# Patient Record
Sex: FEMALE | Race: WHITE | NOT HISPANIC OR LATINO | Employment: UNEMPLOYED | URBAN - NONMETROPOLITAN AREA
[De-identification: names, ages, dates, MRNs, and addresses within clinical notes are randomized per-mention and may not be internally consistent; named-entity substitution may affect disease eponyms.]

---

## 2023-06-04 ENCOUNTER — HOSPITAL ENCOUNTER (EMERGENCY)
Facility: HOSPITAL | Age: 68
Discharge: HOME | End: 2023-06-04
Attending: EMERGENCY MEDICINE | Admitting: EMERGENCY MEDICINE
Payer: COMMERCIAL

## 2023-06-04 VITALS
HEIGHT: 67 IN | WEIGHT: 215 LBS | OXYGEN SATURATION: 98 % | DIASTOLIC BLOOD PRESSURE: 102 MMHG | HEART RATE: 80 BPM | TEMPERATURE: 97.9 F | SYSTOLIC BLOOD PRESSURE: 162 MMHG | BODY MASS INDEX: 33.74 KG/M2 | RESPIRATION RATE: 18 BRPM

## 2023-06-04 DIAGNOSIS — S05.02XA ABRASION OF LEFT CORNEA, INITIAL ENCOUNTER: Primary | ICD-10-CM

## 2023-06-04 PROCEDURE — 99282 EMERGENCY DEPT VISIT SF MDM: CPT

## 2023-06-04 RX ORDER — ERYTHROMYCIN 5 MG/G
0.5 OINTMENT OPHTHALMIC ONCE
Status: COMPLETED | OUTPATIENT
Start: 2023-06-04 | End: 2023-06-04

## 2023-06-04 RX ORDER — TETRACAINE HYDROCHLORIDE 5 MG/ML
2 SOLUTION OPHTHALMIC ONCE
Status: COMPLETED | OUTPATIENT
Start: 2023-06-04 | End: 2023-06-04

## 2023-06-04 RX ORDER — ERYTHROMYCIN 5 MG/G
OINTMENT OPHTHALMIC EVERY 6 HOURS SCHEDULED
Qty: 50 G | Refills: 0 | Status: SHIPPED | OUTPATIENT
Start: 2023-06-04 | End: 2023-06-09

## 2023-06-04 RX ADMIN — TETRACAINE HYDROCHLORIDE 2 DROP: 5 SOLUTION OPHTHALMIC at 13:12

## 2023-06-04 RX ADMIN — ERYTHROMYCIN 0.5 INCH: 5 OINTMENT OPHTHALMIC at 14:12

## 2023-06-04 RX ADMIN — FLUORESCEIN SODIUM 1 STRIP: 1 STRIP OPHTHALMIC at 13:12

## 2023-06-04 NOTE — ED PROVIDER NOTES
History  Chief Complaint   Patient presents with   • Eye Problem     Pt arrives reporting left eye pain since yesterday when she was at her sisters house and her dog got scared and accidentally jumped into left eye and scratched it  HPI  67F presenting with left eye pain  States yesterday, she was at her sister's house while it was thundering  Her sister's dog was there and it jumped at her, and its paw scratched her in the left eye  Since then, she has been having left eye pain + blurry vision  Sometimes wears readers glasses  Does not wear contact lenses  Past Medical History:   Diagnosis Date   • Hypertension        Past Surgical History:   Procedure Laterality Date   • APPENDECTOMY     • EAR SURGERY         History reviewed  No pertinent family history  I have reviewed and agree with the history as documented  E-Cigarette/Vaping   • E-Cigarette Use Never User      E-Cigarette/Vaping Substances     Social History     Tobacco Use   • Smoking status: Never   • Smokeless tobacco: Never   Vaping Use   • Vaping Use: Never used   Substance Use Topics   • Alcohol use: Yes   • Drug use: Never       Review of Systems   Constitutional: Negative for chills and fever  HENT: Negative for ear pain and sore throat  Eyes: Positive for pain  Negative for visual disturbance  Respiratory: Negative for cough and shortness of breath  Cardiovascular: Negative for chest pain and palpitations  Gastrointestinal: Negative for abdominal pain and vomiting  Genitourinary: Negative for dysuria and hematuria  Musculoskeletal: Negative for arthralgias and back pain  Skin: Negative for color change and rash  Neurological: Negative for seizures and syncope  All other systems reviewed and are negative  Physical Exam  Physical Exam  Vitals and nursing note reviewed  Constitutional:       General: She is not in acute distress  Appearance: She is well-developed     HENT:      Head: Normocephalic and atraumatic  Right Ear: External ear normal       Left Ear: External ear normal       Nose: Nose normal    Eyes:      General: Lids are normal  Lids are everted, no foreign bodies appreciated  Vision grossly intact  Gaze aligned appropriately  Extraocular Movements: Extraocular movements intact  Conjunctiva/sclera:      Left eye: Left conjunctiva is injected  Pupils:      Left eye: Corneal abrasion and fluorescein uptake present  Eva exam negative  Cardiovascular:      Rate and Rhythm: Normal rate and regular rhythm  Pulmonary:      Effort: Pulmonary effort is normal  No respiratory distress  Breath sounds: Normal breath sounds  Abdominal:      Palpations: Abdomen is soft  Tenderness: There is no abdominal tenderness  Musculoskeletal:      Cervical back: Normal range of motion and neck supple  Skin:     General: Skin is warm and dry  Neurological:      General: No focal deficit present  Mental Status: She is alert  Mental status is at baseline  Vital Signs  ED Triage Vitals [06/04/23 1229]   Temperature Pulse Respirations Blood Pressure SpO2   97 9 °F (36 6 °C) 80 18 (!) 162/102 98 %      Temp src Heart Rate Source Patient Position - Orthostatic VS BP Location FiO2 (%)   -- -- -- -- --      Pain Score       7           Vitals:    06/04/23 1229   BP: (!) 162/102   Pulse: 80         Visual Acuity      ED Medications  Medications   fluorescein sodium sterile ophthalmic strip 1 strip (1 strip Left Eye Given 6/4/23 1312)   tetracaine 0 5 % ophthalmic solution 2 drop (2 drops Left Eye Given 6/4/23 1312)   erythromycin (ILOTYCIN) 0 5 % ophthalmic ointment 0 5 inch (0 5 inches Left Eye Given 6/4/23 1412)       Diagnostic Studies  Results Reviewed     None               Procedures  Procedures     ED Course     Medical Decision Making  67F presenting with left eye pain  Scratched by dog  No obvious signs of trauma on exam  Vision grossly intact  EOMI   Fluorescein stain significant for corneal abrasion at approximately 8 o'clock  Negative Eva's sign therefore doubt globe rupture  Prescribed erythromycin ointment for corneal abrasion  Patient also requested optometry referral  I have provided her contact information for optometry and advised follow-up as well for corneal abrasions  Advised return precautions  Discharged in stable condition  Abrasion of left cornea, initial encounter: acute illness or injury  Risk  Prescription drug management  Disposition  Final diagnoses:   Abrasion of left cornea, initial encounter     Time reflects when diagnosis was documented in both MDM as applicable and the Disposition within this note     Time User Action Codes Description Comment    6/4/2023  1:49 PM Rolando Ogden Add [S05  02XA] Abrasion of left cornea, initial encounter       ED Disposition     ED Disposition   Discharge    Condition   Stable    Date/Time   Sun Jun 4, 2023  1:49 PM    [address] discharge to home/self care  Follow-up Information     Follow up With Specialties Details Why [address] [place] [address],  Optometry Call   [address] [place] [address],[facility] Floor  [phone]            Discharge Medication List as of 6/4/2023  1:53 PM      START taking these medications    Details   erythromycin (ILOTYCIN) ophthalmic ointment Administer into the left eye every 6 (six) hours for 5 days Place a 1/2 inch ribbon of ointment into the lower eyelid  , Starting Sun 6/4/2023, Until Fri 6/9/2023, Normal             No discharge procedures on file      PDMP Review     None          ED Provider  Electronically Signed by           Kyle Wright MD  06/04/23 8987

## 2023-06-06 ENCOUNTER — DOCTOR'S OFFICE (OUTPATIENT)
Dept: URBAN - NONMETROPOLITAN AREA CLINIC 1 | Facility: CLINIC | Age: 68
Setting detail: OPHTHALMOLOGY
End: 2023-06-06

## 2023-06-06 ENCOUNTER — RX ONLY (RX ONLY)
Age: 68
End: 2023-06-06

## 2023-06-06 DIAGNOSIS — H02.9: ICD-10-CM

## 2023-06-06 DIAGNOSIS — S05.02XA: ICD-10-CM

## 2023-06-06 DIAGNOSIS — H25.13: ICD-10-CM

## 2023-06-06 PROCEDURE — 92004 COMPRE OPH EXAM NEW PT 1/>: CPT | Performed by: OPHTHALMOLOGY

## 2023-06-06 ASSESSMENT — REFRACTION_AUTOREFRACTION
OD_CYLINDER: 0.00
OD_SPHERE: +0.75
OD_AXIS: 180

## 2023-06-06 ASSESSMENT — SPHEQUIV_DERIVED: OD_SPHEQUIV: 0.75

## 2023-06-06 ASSESSMENT — VISUAL ACUITY
OS_BCVA: 20/25+2
OD_BCVA: 20/50

## 2023-06-06 ASSESSMENT — CONFRONTATIONAL VISUAL FIELD TEST (CVF)
OD_FINDINGS: FULL
OS_FINDINGS: FULL

## 2023-06-06 ASSESSMENT — CORNEAL TRAUMA - ABRASION: OS_ABRASION: PRESENT

## 2023-06-09 ENCOUNTER — RX ONLY (RX ONLY)
Age: 68
End: 2023-06-09

## 2023-06-09 ENCOUNTER — DOCTOR'S OFFICE (OUTPATIENT)
Dept: URBAN - NONMETROPOLITAN AREA CLINIC 1 | Facility: CLINIC | Age: 68
Setting detail: OPHTHALMOLOGY
End: 2023-06-09

## 2023-06-09 DIAGNOSIS — H18.893: ICD-10-CM

## 2023-06-09 DIAGNOSIS — H02.9: ICD-10-CM

## 2023-06-09 DIAGNOSIS — S05.02XD: ICD-10-CM

## 2023-06-09 PROCEDURE — 99213 OFFICE O/P EST LOW 20 MIN: CPT | Performed by: OPHTHALMOLOGY

## 2023-06-09 ASSESSMENT — CORNEAL DYSTROPHY - POSTERIOR
OD_POSTERIORDYSTROPHY: GUTTATA
OS_POSTERIORDYSTROPHY: GUTTATA

## 2023-06-09 ASSESSMENT — REFRACTION_AUTOREFRACTION
OS_SPHERE: 0.00
OD_SPHERE: +0.50
OD_AXIS: 086
OS_CYLINDER: -0.75
OD_CYLINDER: -0.50
OS_AXIS: 113

## 2023-06-09 ASSESSMENT — CONFRONTATIONAL VISUAL FIELD TEST (CVF)
OD_FINDINGS: FULL
OS_FINDINGS: FULL

## 2023-06-09 ASSESSMENT — KERATOMETRY
OD_K1POWER_DIOPTERS: 43.75
OD_K2POWER_DIOPTERS: 45.00
OS_AXISANGLE_DEGREES: 089
OD_AXISANGLE_DEGREES: 080
OS_K2POWER_DIOPTERS: 45.50
OS_K1POWER_DIOPTERS: 44.50

## 2023-06-09 ASSESSMENT — VISUAL ACUITY
OS_BCVA: 20/20-2
OD_BCVA: 20/40-1

## 2023-06-09 ASSESSMENT — SPHEQUIV_DERIVED
OD_SPHEQUIV: 0.25
OS_SPHEQUIV: -0.375

## 2023-06-09 ASSESSMENT — AXIALLENGTH_DERIVED
OS_AL: 23.1939
OD_AL: 23.1804

## 2023-06-19 ENCOUNTER — RX ONLY (RX ONLY)
Age: 68
End: 2023-06-19

## 2023-06-19 ENCOUNTER — DOCTOR'S OFFICE (OUTPATIENT)
Dept: URBAN - NONMETROPOLITAN AREA CLINIC 1 | Facility: CLINIC | Age: 68
Setting detail: OPHTHALMOLOGY
End: 2023-06-19
Payer: COMMERCIAL

## 2023-06-19 DIAGNOSIS — H25.13: ICD-10-CM

## 2023-06-19 DIAGNOSIS — H02.9: ICD-10-CM

## 2023-06-19 DIAGNOSIS — S05.02XD: ICD-10-CM

## 2023-06-19 DIAGNOSIS — H18.893: ICD-10-CM

## 2023-06-19 PROCEDURE — 99213 OFFICE O/P EST LOW 20 MIN: CPT | Performed by: OPHTHALMOLOGY

## 2023-06-19 ASSESSMENT — KERATOMETRY
OD_K1POWER_DIOPTERS: 43.50
OS_K2POWER_DIOPTERS: 44.50
OD_K2POWER_DIOPTERS: 44.75
OD_AXISANGLE_DEGREES: 085
OS_K1POWER_DIOPTERS: 44.00
OS_AXISANGLE_DEGREES: 103

## 2023-06-19 ASSESSMENT — CONFRONTATIONAL VISUAL FIELD TEST (CVF)
OD_FINDINGS: FULL
OS_FINDINGS: FULL

## 2023-06-19 ASSESSMENT — AXIALLENGTH_DERIVED
OS_AL: 23.1777
OD_AL: 23.0349

## 2023-06-19 ASSESSMENT — VISUAL ACUITY
OS_BCVA: 20/20-2
OD_BCVA: 20/25-2

## 2023-06-19 ASSESSMENT — REFRACTION_AUTOREFRACTION
OS_AXIS: 103
OD_CYLINDER: -0.25
OS_CYLINDER: -0.75
OS_SPHERE: +0.75
OD_AXIS: 091
OD_SPHERE: +1.00

## 2023-06-19 ASSESSMENT — REFRACTION_CURRENTRX
OS_OVR_VA: 20/
OD_OVR_VA: 20/

## 2023-06-19 ASSESSMENT — SPHEQUIV_DERIVED
OD_SPHEQUIV: 0.875
OS_SPHEQUIV: 0.375

## 2023-06-19 ASSESSMENT — CORNEAL DYSTROPHY - POSTERIOR
OS_POSTERIORDYSTROPHY: GUTTATA
OD_POSTERIORDYSTROPHY: GUTTATA